# Patient Record
Sex: FEMALE | Race: WHITE | URBAN - METROPOLITAN AREA
[De-identification: names, ages, dates, MRNs, and addresses within clinical notes are randomized per-mention and may not be internally consistent; named-entity substitution may affect disease eponyms.]

---

## 2019-04-01 ENCOUNTER — HOSPITAL ENCOUNTER (OUTPATIENT)
Dept: LAB | Facility: HOSPITAL | Age: 74
Discharge: HOME | End: 2019-04-01
Attending: INTERNAL MEDICINE

## 2019-04-01 LAB
ANION GAP SERPL CALC-SCNC: 16 MMOL/L (ref 8–19)
BUN SERPL-MCNC: 15 MG/DL (ref 5–25)
BUN/CREAT SERPL: 17 {RATIO} (ref 6–20)
CALCIUM SERPL-MCNC: 10 MG/DL (ref 8.7–10.4)
CHLORIDE SERPL-SCNC: 102 MMOL/L (ref 99–111)
CONV CO2: 26 MMOL/L (ref 22–32)
CREAT UR-MCNC: 0.86 MG/DL (ref 0.5–0.9)
GFR SERPLBLD BASED ON 1.73 SQ M-ARVRAT: >60 ML/MIN/{1.73_M2}
GLUCOSE SERPL-MCNC: 96 MG/DL (ref 65–99)
OSMOLALITY SERPL CALC.SUM OF ELEC: 291 MOSM/KG (ref 273–304)
POTASSIUM SERPL-SCNC: 4.2 MMOL/L (ref 3.5–5.3)
SODIUM SERPL-SCNC: 140 MMOL/L (ref 135–147)
TSH SERPL-ACNC: 0.81 M[IU]/L (ref 0.27–4.2)

## 2019-05-10 ENCOUNTER — CONVERSION ENCOUNTER (OUTPATIENT)
Dept: ORTHOPEDIC SURGERY | Facility: CLINIC | Age: 74
End: 2019-05-10

## 2019-05-10 ENCOUNTER — OFFICE VISIT CONVERTED (OUTPATIENT)
Dept: ORTHOPEDIC SURGERY | Facility: CLINIC | Age: 74
End: 2019-05-10
Attending: ORTHOPAEDIC SURGERY

## 2019-08-05 ENCOUNTER — HOSPITAL ENCOUNTER (OUTPATIENT)
Dept: LAB | Facility: HOSPITAL | Age: 74
Discharge: HOME | End: 2019-08-05
Attending: INTERNAL MEDICINE

## 2019-08-05 LAB
ALBUMIN SERPL-MCNC: 4.5 G/DL (ref 3.5–5)
ALBUMIN/GLOB SERPL: 1.3 {RATIO} (ref 1.4–2.6)
ALP SERPL-CCNC: 83 U/L (ref 43–160)
ALT SERPL-CCNC: 15 U/L (ref 10–40)
ANION GAP SERPL CALC-SCNC: 17 MMOL/L (ref 8–19)
AST SERPL-CCNC: 18 U/L (ref 15–50)
BASOPHILS # BLD AUTO: 0.05 10*3/UL (ref 0–0.2)
BASOPHILS NFR BLD AUTO: 1 % (ref 0–3)
BILIRUB SERPL-MCNC: 0.55 MG/DL (ref 0.2–1.3)
BUN SERPL-MCNC: 16 MG/DL (ref 5–25)
BUN/CREAT SERPL: 16 {RATIO} (ref 6–20)
CALCIUM SERPL-MCNC: 9.8 MG/DL (ref 8.7–10.4)
CHLORIDE SERPL-SCNC: 102 MMOL/L (ref 99–111)
CONV ABS IMM GRAN: 0.02 10*3/UL (ref 0–0.2)
CONV CO2: 25 MMOL/L (ref 22–32)
CONV IMMATURE GRAN: 0.4 % (ref 0–1.8)
CONV TOTAL PROTEIN: 8.1 G/DL (ref 6.3–8.2)
CREAT UR-MCNC: 0.98 MG/DL (ref 0.5–0.9)
DEPRECATED RDW RBC AUTO: 44.9 FL (ref 36.4–46.3)
EOSINOPHIL # BLD AUTO: 0.04 10*3/UL (ref 0–0.7)
EOSINOPHIL # BLD AUTO: 0.8 % (ref 0–7)
ERYTHROCYTE [DISTWIDTH] IN BLOOD BY AUTOMATED COUNT: 12.6 % (ref 11.7–14.4)
FOLATE SERPL-MCNC: 6.8 NG/ML (ref 4.8–20)
GFR SERPLBLD BASED ON 1.73 SQ M-ARVRAT: 58 ML/MIN/{1.73_M2}
GLOBULIN UR ELPH-MCNC: 3.6 G/DL (ref 2–3.5)
GLUCOSE SERPL-MCNC: 103 MG/DL (ref 65–99)
HCT VFR BLD AUTO: 41.6 % (ref 37–47)
HGB BLD-MCNC: 13.8 G/DL (ref 12–16)
LYMPHOCYTES # BLD AUTO: 1.74 10*3/UL (ref 1–5)
LYMPHOCYTES NFR BLD AUTO: 33.5 % (ref 20–45)
MCH RBC QN AUTO: 32.2 PG (ref 27–31)
MCHC RBC AUTO-ENTMCNC: 33.2 G/DL (ref 33–37)
MCV RBC AUTO: 97 FL (ref 81–99)
MONOCYTES # BLD AUTO: 0.45 10*3/UL (ref 0.2–1.2)
MONOCYTES NFR BLD AUTO: 8.7 % (ref 3–10)
NEUTROPHILS # BLD AUTO: 2.89 10*3/UL (ref 2–8)
NEUTROPHILS NFR BLD AUTO: 55.6 % (ref 30–85)
NRBC CBCN: 0 % (ref 0–0.7)
OSMOLALITY SERPL CALC.SUM OF ELEC: 291 MOSM/KG (ref 273–304)
PLATELET # BLD AUTO: 269 10*3/UL (ref 130–400)
PMV BLD AUTO: 9.5 FL (ref 9.4–12.3)
POTASSIUM SERPL-SCNC: 4.3 MMOL/L (ref 3.5–5.3)
RBC # BLD AUTO: 4.29 10*6/UL (ref 4.2–5.4)
SODIUM SERPL-SCNC: 140 MMOL/L (ref 135–147)
TSH SERPL-ACNC: 1.3 M[IU]/L (ref 0.27–4.2)
VIT B12 SERPL-MCNC: 208 PG/ML (ref 211–911)
WBC # BLD AUTO: 5.19 10*3/UL (ref 4.8–10.8)

## 2019-08-16 ENCOUNTER — HOSPITAL ENCOUNTER (OUTPATIENT)
Dept: MAMMOGRAPHY | Facility: HOSPITAL | Age: 74
Discharge: HOME | End: 2019-08-16
Attending: INTERNAL MEDICINE

## 2019-08-21 ENCOUNTER — HOSPITAL ENCOUNTER (OUTPATIENT)
Dept: ULTRASOUND IMAGING | Facility: HOSPITAL | Age: 74
Discharge: HOME | End: 2019-08-21
Attending: INTERNAL MEDICINE

## 2019-08-26 ENCOUNTER — OFFICE VISIT CONVERTED (OUTPATIENT)
Dept: SURGERY | Facility: CLINIC | Age: 74
End: 2019-08-26
Attending: SURGERY

## 2019-08-27 ENCOUNTER — HOSPITAL ENCOUNTER (OUTPATIENT)
Dept: PERIOP | Facility: HOSPITAL | Age: 74
Setting detail: HOSPITAL OUTPATIENT SURGERY
Discharge: HOME | End: 2019-08-27
Attending: SURGERY

## 2019-08-28 ENCOUNTER — HOSPITAL ENCOUNTER (OUTPATIENT)
Dept: ONCOLOGY | Facility: HOSPITAL | Age: 74
Discharge: HOME | End: 2019-08-28
Attending: INTERNAL MEDICINE

## 2019-08-28 LAB
ALBUMIN SERPL-MCNC: 4.6 G/DL (ref 3.5–5)
ALBUMIN/GLOB SERPL: 1.3 {RATIO} (ref 1.4–2.6)
ALP SERPL-CCNC: 84 U/L (ref 43–160)
ALT SERPL-CCNC: 10 U/L (ref 10–40)
ANION GAP SERPL CALC-SCNC: 19 MMOL/L (ref 8–19)
AST SERPL-CCNC: 17 U/L (ref 15–50)
BASOPHILS # BLD AUTO: 0.03 10*3/UL (ref 0–0.2)
BASOPHILS NFR BLD AUTO: 0.4 % (ref 0–3)
BILIRUB SERPL-MCNC: 0.72 MG/DL (ref 0.2–1.3)
BUN SERPL-MCNC: 18 MG/DL (ref 5–25)
BUN/CREAT SERPL: 17 {RATIO} (ref 6–20)
CALCIUM SERPL-MCNC: 9.5 MG/DL (ref 8.7–10.4)
CHLORIDE SERPL-SCNC: 103 MMOL/L (ref 99–111)
CONV ABS IMM GRAN: 0.01 10*3/UL (ref 0–0.2)
CONV CO2: 24 MMOL/L (ref 22–32)
CONV IMMATURE GRAN: 0.1 % (ref 0–1.8)
CONV TOTAL PROTEIN: 8.1 G/DL (ref 6.3–8.2)
CREAT UR-MCNC: 1.05 MG/DL (ref 0.5–0.9)
DEPRECATED RDW RBC AUTO: 44.1 FL (ref 36.4–46.3)
EOSINOPHIL # BLD AUTO: 0.07 10*3/UL (ref 0–0.7)
EOSINOPHIL # BLD AUTO: 1 % (ref 0–7)
ERYTHROCYTE [DISTWIDTH] IN BLOOD BY AUTOMATED COUNT: 12.3 % (ref 11.7–14.4)
GFR SERPLBLD BASED ON 1.73 SQ M-ARVRAT: 53 ML/MIN/{1.73_M2}
GLOBULIN UR ELPH-MCNC: 3.5 G/DL (ref 2–3.5)
GLUCOSE SERPL-MCNC: 95 MG/DL (ref 65–99)
HCT VFR BLD AUTO: 41.7 % (ref 37–47)
HGB BLD-MCNC: 13.9 G/DL (ref 12–16)
LYMPHOCYTES # BLD AUTO: 2.26 10*3/UL (ref 1–5)
LYMPHOCYTES NFR BLD AUTO: 32.1 % (ref 20–45)
MCH RBC QN AUTO: 32.4 PG (ref 27–31)
MCHC RBC AUTO-ENTMCNC: 33.3 G/DL (ref 33–37)
MCV RBC AUTO: 97.2 FL (ref 81–99)
MONOCYTES # BLD AUTO: 0.61 10*3/UL (ref 0.2–1.2)
MONOCYTES NFR BLD AUTO: 8.7 % (ref 3–10)
NEUTROPHILS # BLD AUTO: 4.07 10*3/UL (ref 2–8)
NEUTROPHILS NFR BLD AUTO: 57.7 % (ref 30–85)
NRBC CBCN: 0 % (ref 0–0.7)
OSMOLALITY SERPL CALC.SUM OF ELEC: 296 MOSM/KG (ref 273–304)
PLATELET # BLD AUTO: 242 10*3/UL (ref 130–400)
PMV BLD AUTO: 9.6 FL (ref 9.4–12.3)
POTASSIUM SERPL-SCNC: 4.2 MMOL/L (ref 3.5–5.3)
RBC # BLD AUTO: 4.29 10*6/UL (ref 4.2–5.4)
SODIUM SERPL-SCNC: 142 MMOL/L (ref 135–147)
WBC # BLD AUTO: 7.05 10*3/UL (ref 4.8–10.8)

## 2019-08-29 ENCOUNTER — HOSPITAL ENCOUNTER (OUTPATIENT)
Dept: CARDIOLOGY | Facility: HOSPITAL | Age: 74
Discharge: HOME | End: 2019-08-29
Attending: INTERNAL MEDICINE

## 2019-08-30 ENCOUNTER — HOSPITAL ENCOUNTER (OUTPATIENT)
Dept: CT IMAGING | Facility: HOSPITAL | Age: 74
Discharge: HOME | End: 2019-08-30
Attending: INTERNAL MEDICINE

## 2019-09-03 ENCOUNTER — HOSPITAL ENCOUNTER (OUTPATIENT)
Dept: MRI IMAGING | Facility: HOSPITAL | Age: 74
Discharge: HOME | End: 2019-09-03
Attending: SURGERY

## 2019-09-04 ENCOUNTER — OFFICE VISIT CONVERTED (OUTPATIENT)
Dept: SURGERY | Facility: CLINIC | Age: 74
End: 2019-09-04
Attending: SURGERY

## 2019-09-06 ENCOUNTER — HOSPITAL ENCOUNTER (OUTPATIENT)
Dept: OTHER | Facility: HOSPITAL | Age: 74
Setting detail: RECURRING SERIES
Discharge: HOME | End: 2019-09-30
Attending: INTERNAL MEDICINE

## 2019-09-06 LAB
ALBUMIN SERPL-MCNC: 4.6 G/DL (ref 3.5–5)
ALBUMIN/GLOB SERPL: 1.2 {RATIO} (ref 1.4–2.6)
ALP SERPL-CCNC: 85 U/L (ref 43–160)
ALT SERPL-CCNC: 14 U/L (ref 10–40)
ANION GAP SERPL CALC-SCNC: 15 MMOL/L (ref 8–19)
AST SERPL-CCNC: 22 U/L (ref 15–50)
BASOPHILS # BLD AUTO: 0.03 10*3/UL (ref 0–0.2)
BASOPHILS NFR BLD AUTO: 0.5 % (ref 0–3)
BILIRUB SERPL-MCNC: 0.56 MG/DL (ref 0.2–1.3)
BUN SERPL-MCNC: 15 MG/DL (ref 5–25)
BUN/CREAT SERPL: 16 {RATIO} (ref 6–20)
CALCIUM SERPL-MCNC: 9.6 MG/DL (ref 8.7–10.4)
CHLORIDE SERPL-SCNC: 105 MMOL/L (ref 99–111)
CONV ABS IMM GRAN: 0.01 10*3/UL (ref 0–0.2)
CONV CO2: 25 MMOL/L (ref 22–32)
CONV IMMATURE GRAN: 0.2 % (ref 0–1.8)
CONV TOTAL PROTEIN: 8.4 G/DL (ref 6.3–8.2)
CREAT UR-MCNC: 0.96 MG/DL (ref 0.5–0.9)
DEPRECATED RDW RBC AUTO: 43.5 FL (ref 36.4–46.3)
EOSINOPHIL # BLD AUTO: 0.08 10*3/UL (ref 0–0.7)
EOSINOPHIL # BLD AUTO: 1.4 % (ref 0–7)
ERYTHROCYTE [DISTWIDTH] IN BLOOD BY AUTOMATED COUNT: 12.2 % (ref 11.7–14.4)
GFR SERPLBLD BASED ON 1.73 SQ M-ARVRAT: 59 ML/MIN/{1.73_M2}
GLOBULIN UR ELPH-MCNC: 3.8 G/DL (ref 2–3.5)
GLUCOSE SERPL-MCNC: 94 MG/DL (ref 65–99)
HCT VFR BLD AUTO: 42.7 % (ref 37–47)
HGB BLD-MCNC: 14.3 G/DL (ref 12–16)
LYMPHOCYTES # BLD AUTO: 2.08 10*3/UL (ref 1–5)
LYMPHOCYTES NFR BLD AUTO: 36.4 % (ref 20–45)
MCH RBC QN AUTO: 32.2 PG (ref 27–31)
MCHC RBC AUTO-ENTMCNC: 33.5 G/DL (ref 33–37)
MCV RBC AUTO: 96.2 FL (ref 81–99)
MONOCYTES # BLD AUTO: 0.5 10*3/UL (ref 0.2–1.2)
MONOCYTES NFR BLD AUTO: 8.8 % (ref 3–10)
NEUTROPHILS # BLD AUTO: 3.01 10*3/UL (ref 2–8)
NEUTROPHILS NFR BLD AUTO: 52.7 % (ref 30–85)
NRBC CBCN: 0 % (ref 0–0.7)
OSMOLALITY SERPL CALC.SUM OF ELEC: 293 MOSM/KG (ref 273–304)
PLATELET # BLD AUTO: 255 10*3/UL (ref 130–400)
PMV BLD AUTO: 9.9 FL (ref 9.4–12.3)
POTASSIUM SERPL-SCNC: 4.2 MMOL/L (ref 3.5–5.3)
RBC # BLD AUTO: 4.44 10*6/UL (ref 4.2–5.4)
SODIUM SERPL-SCNC: 141 MMOL/L (ref 135–147)
WBC # BLD AUTO: 5.71 10*3/UL (ref 4.8–10.8)

## 2019-09-09 LAB
APPEARANCE UR: ABNORMAL
BILIRUB UR QL: NEGATIVE
COLOR UR: YELLOW
CONV BACTERIA: NEGATIVE
CONV COLLECTION SOURCE (UA): ABNORMAL
CONV HYALINE CASTS IN URINE MICRO: ABNORMAL /[LPF]
CONV UROBILINOGEN IN URINE BY AUTOMATED TEST STRIP: 0.2 {EHRLICHU}/DL (ref 0.1–1)
GLUCOSE UR QL: NEGATIVE MG/DL
HGB UR QL STRIP: ABNORMAL
KETONES UR QL STRIP: NEGATIVE MG/DL
LEUKOCYTE ESTERASE UR QL STRIP: ABNORMAL
NITRITE UR QL STRIP: NEGATIVE
PH UR STRIP.AUTO: 5 [PH] (ref 5–8)
PROT UR QL: NEGATIVE MG/DL
RBC #/AREA URNS HPF: ABNORMAL /[HPF]
SP GR UR: 1.02 (ref 1–1.03)
SQUAMOUS SPT QL MICRO: ABNORMAL /[HPF]
WBC #/AREA URNS HPF: ABNORMAL /[HPF]

## 2019-09-30 LAB
ALBUMIN SERPL-MCNC: 4.3 G/DL (ref 3.5–5)
ALBUMIN/GLOB SERPL: 1.2 {RATIO} (ref 1.4–2.6)
ALP SERPL-CCNC: 79 U/L (ref 43–160)
ALT SERPL-CCNC: 23 U/L (ref 10–40)
ANION GAP SERPL CALC-SCNC: 18 MMOL/L (ref 8–19)
AST SERPL-CCNC: 25 U/L (ref 15–50)
BASOPHILS # BLD AUTO: 0.08 10*3/UL (ref 0–0.2)
BASOPHILS NFR BLD AUTO: 1.9 % (ref 0–3)
BILIRUB SERPL-MCNC: 0.39 MG/DL (ref 0.2–1.3)
BUN SERPL-MCNC: 16 MG/DL (ref 5–25)
BUN/CREAT SERPL: 20 {RATIO} (ref 6–20)
CALCIUM SERPL-MCNC: 9.7 MG/DL (ref 8.7–10.4)
CHLORIDE SERPL-SCNC: 104 MMOL/L (ref 99–111)
CONV ABS IMM GRAN: 0.01 10*3/UL (ref 0–0.54)
CONV CO2: 24 MMOL/L (ref 22–32)
CONV EOSINOPHILS PERCENT BY MANUAL COUNT: 0 % (ref 0–7)
CONV IMMATURE GRAN: 0.2 % (ref 0–0.4)
CONV TOTAL PROTEIN: 7.8 G/DL (ref 6.3–8.2)
CREAT UR-MCNC: 0.8 MG/DL (ref 0.5–0.9)
EOSINOPHIL # BLD MANUAL: 0 10*3/UL (ref 0–0.7)
ERYTHROCYTE [DISTWIDTH] IN BLOOD BY AUTOMATED COUNT: 12.5 % (ref 11.5–14.5)
ERYTHROCYTE [DISTWIDTH] IN BLOOD BY AUTOMATED COUNT: 43.4 FL
GFR SERPLBLD BASED ON 1.73 SQ M-ARVRAT: >60 ML/MIN/{1.73_M2}
GLOBULIN UR ELPH-MCNC: 3.5 G/DL (ref 2–3.5)
GLUCOSE SERPL-MCNC: 96 MG/DL (ref 65–99)
HBA1C MFR BLD: 12.7 G/DL (ref 12–16)
HCT VFR BLD AUTO: 38 % (ref 37–47)
LYMPHOCYTES # BLD AUTO: 1.11 10*3/UL (ref 1–5)
LYMPHOCYTES NFR BLD AUTO: 26.4 % (ref 20–45)
MCH RBC QN AUTO: 32.5 PG (ref 27–31)
MCHC RBC AUTO-ENTMCNC: 33.4 G/DL (ref 33–37)
MCV RBC AUTO: 97.2 FL (ref 81–99)
MONOCYTES # BLD AUTO: 0.61 10*3/UL (ref 0.2–1.2)
MONOCYTES NFR BLD MANUAL: 14.5 % (ref 3–10)
NEUTROPHILS # BLD AUTO: 2.4 10*3/UL (ref 2–8)
NEUTROPHILS NFR BLD MANUAL: 57 % (ref 30–85)
OSMOLALITY SERPL CALC.SUM OF ELEC: 295 MOSM/KG (ref 273–304)
PLATELET # BLD AUTO: 440 10*3/UL (ref 130–400)
PMV BLD AUTO: 9.1 FL (ref 7.4–10.4)
POTASSIUM SERPL-SCNC: 4.3 MMOL/L (ref 3.5–5.3)
RBC MORPH BLD: 3.91 10*6/UL (ref 4.2–5.4)
SODIUM SERPL-SCNC: 142 MMOL/L (ref 135–147)
WBC # BLD AUTO: 4.21 10*3/UL (ref 4.8–10.8)

## 2019-10-10 ENCOUNTER — HOSPITAL ENCOUNTER (OUTPATIENT)
Dept: LAB | Facility: HOSPITAL | Age: 74
Discharge: HOME | End: 2019-10-10
Attending: INTERNAL MEDICINE

## 2019-10-10 LAB
APPEARANCE UR: ABNORMAL
BILIRUB UR QL: NEGATIVE
COLOR UR: ABNORMAL
CONV BACTERIA: ABNORMAL
CONV COLLECTION SOURCE (UA): ABNORMAL
CONV UROBILINOGEN IN URINE BY AUTOMATED TEST STRIP: 0.2 {EHRLICHU}/DL (ref 0.1–1)
CONV YEAST, UA: ABNORMAL
GLUCOSE UR QL: NEGATIVE MG/DL
HGB UR QL STRIP: ABNORMAL
KETONES UR QL STRIP: NEGATIVE MG/DL
LEUKOCYTE ESTERASE UR QL STRIP: ABNORMAL
NITRITE UR QL STRIP: NEGATIVE
PH UR STRIP.AUTO: 5.5 [PH] (ref 5–8)
PROT UR QL: 30 MG/DL
RBC #/AREA URNS HPF: ABNORMAL /[HPF]
SP GR UR: 1.02 (ref 1–1.03)
SQUAMOUS SPT QL MICRO: ABNORMAL /[HPF]
WBC #/AREA URNS HPF: ABNORMAL /[HPF]

## 2019-10-12 LAB
AMOXICILLIN+CLAV SUSC ISLT: 4
AMPICILLIN SUSC ISLT: 8
AMPICILLIN+SULBAC SUSC ISLT: 4
BACTERIA UR CULT: ABNORMAL
CEFAZOLIN SUSC ISLT: <=4
CEFEPIME SUSC ISLT: <=1
CEFTAZIDIME SUSC ISLT: <=1
CEFTRIAXONE SUSC ISLT: <=1
CEFUROXIME ORAL SUSC ISLT: 4
CEFUROXIME PARENTER SUSC ISLT: 4
CIPROFLOXACIN SUSC ISLT: <=0.25
ERTAPENEM SUSC ISLT: <=0.5
GENTAMICIN SUSC ISLT: <=1
LEVOFLOXACIN SUSC ISLT: <=0.12
NITROFURANTOIN SUSC ISLT: <=16
TETRACYCLINE SUSC ISLT: <=1
TMP SMX SUSC ISLT: <=20
TOBRAMYCIN SUSC ISLT: <=1

## 2019-10-14 ENCOUNTER — OFFICE VISIT CONVERTED (OUTPATIENT)
Dept: ONCOLOGY | Facility: HOSPITAL | Age: 74
End: 2019-10-14
Attending: INTERNAL MEDICINE

## 2019-10-14 ENCOUNTER — HOSPITAL ENCOUNTER (OUTPATIENT)
Dept: OTHER | Facility: HOSPITAL | Age: 74
Setting detail: RECURRING SERIES
Discharge: HOME | End: 2019-10-31
Attending: INTERNAL MEDICINE

## 2019-10-14 LAB
ALBUMIN SERPL-MCNC: 4 G/DL (ref 3.5–5)
ALBUMIN/GLOB SERPL: 1.1 {RATIO} (ref 1.4–2.6)
ALP SERPL-CCNC: 119 U/L (ref 43–160)
ALT SERPL-CCNC: 17 U/L (ref 10–40)
ANION GAP SERPL CALC-SCNC: 18 MMOL/L (ref 8–19)
AST SERPL-CCNC: 16 U/L (ref 15–50)
BASOPHILS # BLD AUTO: 0.02 10*3/UL (ref 0–0.2)
BASOPHILS NFR BLD AUTO: 0.4 % (ref 0–3)
BILIRUB SERPL-MCNC: 0.37 MG/DL (ref 0.2–1.3)
BUN SERPL-MCNC: 13 MG/DL (ref 5–25)
BUN/CREAT SERPL: 14 {RATIO} (ref 6–20)
CALCIUM SERPL-MCNC: 9.6 MG/DL (ref 8.7–10.4)
CHLORIDE SERPL-SCNC: 100 MMOL/L (ref 99–111)
CONV ABS IMM GRAN: 0.12 10*3/UL (ref 0–0.54)
CONV CO2: 24 MMOL/L (ref 22–32)
CONV EOSINOPHILS PERCENT BY MANUAL COUNT: 0.8 % (ref 0–7)
CONV IMMATURE GRAN: 2.3 % (ref 0–0.4)
CONV TOTAL PROTEIN: 7.8 G/DL (ref 6.3–8.2)
CREAT UR-MCNC: 0.9 MG/DL (ref 0.5–0.9)
EOSINOPHIL # BLD MANUAL: 0.04 10*3/UL (ref 0–0.7)
ERYTHROCYTE [DISTWIDTH] IN BLOOD BY AUTOMATED COUNT: 12.4 % (ref 11.5–14.5)
ERYTHROCYTE [DISTWIDTH] IN BLOOD BY AUTOMATED COUNT: 43 FL
GFR SERPLBLD BASED ON 1.73 SQ M-ARVRAT: >60 ML/MIN/{1.73_M2}
GLOBULIN UR ELPH-MCNC: 3.8 G/DL (ref 2–3.5)
GLUCOSE SERPL-MCNC: 129 MG/DL (ref 65–99)
HBA1C MFR BLD: 11.4 G/DL (ref 12–16)
HCT VFR BLD AUTO: 34.1 % (ref 37–47)
LYMPHOCYTES # BLD AUTO: 0.29 10*3/UL (ref 1–5)
LYMPHOCYTES NFR BLD AUTO: 5.6 % (ref 20–45)
MCH RBC QN AUTO: 32.5 PG (ref 27–31)
MCHC RBC AUTO-ENTMCNC: 33.4 G/DL (ref 33–37)
MCV RBC AUTO: 97.2 FL (ref 81–99)
MONOCYTES # BLD AUTO: 0.48 10*3/UL (ref 0.2–1.2)
MONOCYTES NFR BLD MANUAL: 9.3 % (ref 3–10)
NEUTROPHILS # BLD AUTO: 4.21 10*3/UL (ref 2–8)
NEUTROPHILS NFR BLD MANUAL: 81.6 % (ref 30–85)
OSMOLALITY SERPL CALC.SUM OF ELEC: 288 MOSM/KG (ref 273–304)
PLATELET # BLD AUTO: 144 10*3/UL (ref 130–400)
PMV BLD AUTO: 8.8 FL (ref 7.4–10.4)
POTASSIUM SERPL-SCNC: 3.8 MMOL/L (ref 3.5–5.3)
RBC MORPH BLD: 3.51 10*6/UL (ref 4.2–5.4)
SODIUM SERPL-SCNC: 138 MMOL/L (ref 135–147)
WBC # BLD AUTO: 5.16 10*3/UL (ref 4.8–10.8)

## 2019-10-16 ENCOUNTER — HOSPITAL ENCOUNTER (OUTPATIENT)
Dept: GENERAL RADIOLOGY | Facility: HOSPITAL | Age: 74
Discharge: HOME | End: 2019-10-16
Attending: NURSE PRACTITIONER

## 2019-10-21 ENCOUNTER — CONVERSION ENCOUNTER (OUTPATIENT)
Dept: OTOLARYNGOLOGY | Facility: CLINIC | Age: 74
End: 2019-10-21

## 2019-10-21 ENCOUNTER — OFFICE VISIT CONVERTED (OUTPATIENT)
Dept: OTOLARYNGOLOGY | Facility: CLINIC | Age: 74
End: 2019-10-21
Attending: OTOLARYNGOLOGY

## 2019-10-24 ENCOUNTER — HOSPITAL ENCOUNTER (OUTPATIENT)
Dept: OTHER | Facility: HOSPITAL | Age: 74
Setting detail: RECURRING SERIES
Discharge: STILL ADMITTED OR ADMINISTRATIVE | End: 2019-10-24
Attending: INTERNAL MEDICINE

## 2019-10-24 ENCOUNTER — OFFICE VISIT CONVERTED (OUTPATIENT)
Dept: ONCOLOGY | Facility: HOSPITAL | Age: 74
End: 2019-10-24
Attending: INTERNAL MEDICINE

## 2019-10-24 ENCOUNTER — HOSPITAL ENCOUNTER (OUTPATIENT)
Dept: OTHER | Facility: HOSPITAL | Age: 74
Discharge: HOME | End: 2019-10-24
Attending: INTERNAL MEDICINE

## 2019-10-25 ENCOUNTER — HOSPITAL ENCOUNTER (OUTPATIENT)
Dept: INFUSION THERAPY | Facility: HOSPITAL | Age: 74
Setting detail: RECURRING SERIES
Discharge: HOME | End: 2019-10-31
Attending: INTERNAL MEDICINE

## 2019-10-28 ENCOUNTER — OFFICE VISIT CONVERTED (OUTPATIENT)
Dept: ONCOLOGY | Facility: HOSPITAL | Age: 74
End: 2019-10-28

## 2019-10-28 LAB
ALBUMIN SERPL-MCNC: 3.6 G/DL (ref 3.5–5)
ALBUMIN/GLOB SERPL: 0.9 {RATIO} (ref 1.4–2.6)
ALP SERPL-CCNC: 86 U/L (ref 43–160)
ALT SERPL-CCNC: 44 U/L (ref 10–40)
ANION GAP SERPL CALC-SCNC: 21 MMOL/L (ref 8–19)
AST SERPL-CCNC: 71 U/L (ref 15–50)
BASOPHILS # BLD AUTO: 0.09 10*3/UL (ref 0–0.2)
BASOPHILS # BLD: 0 % (ref 0–3)
BASOPHILS NFR BLD AUTO: 1.8 % (ref 0–3)
BILIRUB SERPL-MCNC: 1.02 MG/DL (ref 0.2–1.3)
BUN SERPL-MCNC: 9 MG/DL (ref 5–25)
BUN/CREAT SERPL: 13 {RATIO} (ref 6–20)
CALCIUM SERPL-MCNC: 9.7 MG/DL (ref 8.7–10.4)
CHLORIDE SERPL-SCNC: 98 MMOL/L (ref 99–111)
CONV ABS BANDS: 18 % (ref 1–5)
CONV ABS IMM GRAN: 0.14 10*3/UL (ref 0–0.2)
CONV ANISOCYTES: SLIGHT
CONV CO2: 25 MMOL/L (ref 22–32)
CONV IMMATURE GRAN: 2.8 % (ref 0–1.8)
CONV SEGMENTED NEUTROPHILS: 72 % (ref 45–70)
CONV TOTAL PROTEIN: 7.8 G/DL (ref 6.3–8.2)
CREAT UR-MCNC: 0.71 MG/DL (ref 0.5–0.9)
DEPRECATED RDW RBC AUTO: 44.5 FL (ref 36.4–46.3)
EOSINOPHIL # BLD AUTO: 0 % (ref 0–7)
EOSINOPHIL # BLD AUTO: 0 10*3/UL (ref 0–0.7)
EOSINOPHIL NFR BLD AUTO: 0 % (ref 0–7)
ERYTHROCYTE [DISTWIDTH] IN BLOOD BY AUTOMATED COUNT: 13.9 % (ref 11.7–14.4)
GFR SERPLBLD BASED ON 1.73 SQ M-ARVRAT: >60 ML/MIN/{1.73_M2}
GLOBULIN UR ELPH-MCNC: 4.2 G/DL (ref 2–3.5)
GLUCOSE SERPL-MCNC: 196 MG/DL (ref 65–99)
HCT VFR BLD AUTO: 31.8 % (ref 37–47)
HGB BLD-MCNC: 11.2 G/DL (ref 12–16)
LYMPHOCYTES # BLD AUTO: 0.08 10*3/UL (ref 1–5)
LYMPHOCYTES NFR BLD AUTO: 1.6 % (ref 20–45)
MCH RBC QN AUTO: 31.9 PG (ref 27–31)
MCHC RBC AUTO-ENTMCNC: 35.2 G/DL (ref 33–37)
MCV RBC AUTO: 90.6 FL (ref 81–99)
MICROCYTES BLD QL: SLIGHT
MONOCYTES # BLD AUTO: 0.82 10*3/UL (ref 0.2–1.2)
MONOCYTES NFR BLD AUTO: 16.5 % (ref 3–10)
MONOCYTES NFR BLD MANUAL: 8 % (ref 3–10)
NEUTROPHILS # BLD AUTO: 3.83 10*3/UL (ref 2–8)
NEUTROPHILS NFR BLD AUTO: 77.3 % (ref 30–85)
NRBC CBCN: 0.6 % (ref 0–0.7)
NUC CELL # PRT MANUAL: 0 /100{WBCS}
OSMOLALITY SERPL CALC.SUM OF ELEC: 296 MOSM/KG (ref 273–304)
OVALOCYTES BLD QL SMEAR: SLIGHT
PLAT MORPH BLD: NORMAL
PLATELET # BLD AUTO: 146 10*3/UL (ref 130–400)
PMV BLD AUTO: 10.4 FL (ref 9.4–12.3)
POIKILOCYTOSIS BLD QL SMEAR: SLIGHT
POLYCHROMASIA BLD QL SMEAR: ABNORMAL
POTASSIUM SERPL-SCNC: 2.7 MMOL/L (ref 3.5–5.3)
RBC # BLD AUTO: 3.51 10*6/UL (ref 4.2–5.4)
SMALL PLATELETS BLD QL SMEAR: ADEQUATE
SODIUM SERPL-SCNC: 141 MMOL/L (ref 135–147)
TOXIC GRANULATION: SLIGHT
VARIANT LYMPHS NFR BLD MANUAL: 2 % (ref 20–45)
WBC # BLD AUTO: 4.96 10*3/UL (ref 4.8–10.8)

## 2021-05-15 VITALS — HEART RATE: 76 BPM | BODY MASS INDEX: 33.15 KG/M2 | HEIGHT: 65 IN | OXYGEN SATURATION: 96 % | WEIGHT: 199 LBS

## 2021-05-15 VITALS — WEIGHT: 199 LBS | RESPIRATION RATE: 14 BRPM | BODY MASS INDEX: 33.15 KG/M2 | HEIGHT: 65 IN

## 2021-05-15 VITALS
HEART RATE: 82 BPM | DIASTOLIC BLOOD PRESSURE: 88 MMHG | TEMPERATURE: 98.8 F | WEIGHT: 182.25 LBS | HEIGHT: 65 IN | OXYGEN SATURATION: 96 % | BODY MASS INDEX: 30.37 KG/M2 | SYSTOLIC BLOOD PRESSURE: 136 MMHG | RESPIRATION RATE: 16 BRPM

## 2021-05-15 VITALS — WEIGHT: 198 LBS | HEIGHT: 65 IN | BODY MASS INDEX: 32.99 KG/M2 | RESPIRATION RATE: 16 BRPM

## 2021-05-28 VITALS
WEIGHT: 173.72 LBS | DIASTOLIC BLOOD PRESSURE: 73 MMHG | BODY MASS INDEX: 28.91 KG/M2 | TEMPERATURE: 99.3 F | RESPIRATION RATE: 16 BRPM | WEIGHT: 183.42 LBS | RESPIRATION RATE: 16 BRPM | DIASTOLIC BLOOD PRESSURE: 72 MMHG | OXYGEN SATURATION: 97 % | HEART RATE: 84 BPM | TEMPERATURE: 99.4 F | BODY MASS INDEX: 30.52 KG/M2 | SYSTOLIC BLOOD PRESSURE: 122 MMHG | HEART RATE: 69 BPM | OXYGEN SATURATION: 97 % | SYSTOLIC BLOOD PRESSURE: 116 MMHG

## 2021-05-28 VITALS
OXYGEN SATURATION: 91 % | HEIGHT: 65 IN | RESPIRATION RATE: 18 BRPM | BODY MASS INDEX: 28.94 KG/M2 | HEART RATE: 128 BPM | TEMPERATURE: 102.6 F | WEIGHT: 173.72 LBS | SYSTOLIC BLOOD PRESSURE: 169 MMHG | DIASTOLIC BLOOD PRESSURE: 88 MMHG

## 2021-05-28 NOTE — PROGRESS NOTES
Patient: DAYA HAYWOOD     Acct: [id number]     Report: #[id number]  UNIT #: [id number]     : 1945    Encounter Date:10/24/2019  PRIMARY CARE: JANINE COLIN  ***Signed***  --------------------------------------------------------------------------------------------------------------------  NURSE INTAKE      Visit Type      Established Patient Visit            Chief Complaint      TX            Referring Provider/Copies To      Referring Provider:  JANINE COLIN      Primary Care Provider:  JANINE COLIN            History and Present Illness      Past Oncology Illness History      Ms. Haywood is a 74yo WF who presents today for initial visit for recently     diagnosed breast cancer.  Patient reports that approximately 2 to 3 months ago     she noticed a full sensation in the left nipple.  She thought it was related to     her dog scratching her.  Approximately 1 month ago, she noted a lump in the left    breast.  She sought evaluation with her primary care provider who then ordered     mammogram confirming a suspicious area in the left breast.  Ultrasound confirmed    the finding.  Biopsy was recommended and this demonstrated infiltrating ductal     carcinoma, ER positive, [place] positive, HER-2 negative, Ki-67 50%.  Biopsy of a     lymph node also demonstrated a macro metastatic focus of breast cancer.  She has    been seen by Dr. Woo with surgery who has ordered MRI of the breast as     there was some question of skin involvement on the mammogram and ultrasound.      She also placed a Port-A-Cath as she recommended neoadjuvant chemotherapy.      Patient denies any other masses or lymphadenopathy.  She has been in excellent     health.  She is eating and drinking normally, her weight is maintained.  Her     energy level is adequate for all of her daily needs, ECOG PS 0.  She reports     normal bladder and bowel habits.            HPI - Oncology Interim      Patient presents today as an unscheduled visit  with complaints of increasing     mouth soreness, poor oral intake and weight loss.  She received Adriamycin and     Cytoxan recently.  For the last week or so she has had increasing sore mouth.      She was given Magic mouthwash which helps briefly but does not last very long.      She was seen by ENT and found to have a Zenker's diverticulum but they did not     feel that it was causing her mouth issues.  She has only been able to take sips     of liquids.  More solid foods cause her great difficulty.  She has lost     approximately 15 pounds over the last week or 2.  Her energy is subsequently     suffering.  She has not had any other issues with her chemotherapy.            Cancer Details            Left breast--Infiltrating ductal carcinoma ER/[place]+ HER-2- Ki-67 50%            Clinical Staging      Stage IIIB (T4N1M0)            Treatments      Chemotherapy      9/9/19 initiated DDAC            Clinical Trial Participant      No            ECOG Performance Status      0            Most Recent Lab Findings      Laboratory Tests      10/14/19 09:35            10/14/19 09:38            PAST, FAMILY   Past Medical History      Past Medical History:  Arthritis, Hypertension, Thyroid Disease      Hematology/Oncology (F):  Anemia, Breast Cancer, Skin Cancer            Past Surgical History      Biopsy (LEFT BREAST), Cholecystectomy, Skin Cancer Removal            KNEE SURGERY, SHOULDER SURGERY            Family History      Family History:  Prostate Cancer (FATHER)            BROTHER AND SISTER DIABETES.            Social History      Marital Status:  Single      Lives independently:  Yes      Number of Children:  4      Occupation:  [employer]            Tobacco Use      Tobacco status:  Never smoker            Alcohol Use      Alcohol intake:  SOMETIMES            Substance Use      Substance use:  Denies use            REVIEW OF SYSTEMS      General:  Admits: Fatigue, Weight Loss      Eye:  Admits Corrective  Lenses      ENT:  Denies Headache, Denies Sore Throat      Cardiovascular:  Denies Chest Pain      Respiratory:  Denies: Productive Cough      Gastrointestinal:  Admits Problem Swallowing      Musculoskeletal:  Admits Muscle Pain, Admits Painful Joints      Neurologic:  Denies Dizziness, Denies Numbness\Tingling            VITAL SIGNS AND SCORES      Vitals      Weight 173 lbs 11.560 oz / 78.8 kg      Temperature 99.3 F / 37.39 C      Pulse 84      Respirations 16      Blood Pressure 122/73 Sitting      Pulse Oximetry 97%, RM AIR            Pain Score      Pain Scale Used:  Numerical      Pain Intensity:  9            Fatigue Score      Fatigue (0-10 scale):  6            EXAM      General Appearance:  Positive for: Alert, Oriented x3, Cooperative;          Negative for: Acute Distress      Eye:  Positive for: Anicteric Sclerae, Moist Conjunctiva      HEENT:  Negative for: Oropharynx clear (Diffuse erythema without ulcerations of     the buccal mucosa, palate and tongue.  There are a few cotton-wool spots)      Neck:  Positive for: Supple;          Negative for: JVD, Masses      Respiratory:  Positive for: CTAB, Normal Respiratory Effort      Chest:  Port in Place      Abdomen/Gastro:  Positive for: Normal Active Bowel Sounds, Soft;          Negative for: Distention, Hepatosplenomegaly, Tenderness      Cardiovascular:  Positive for: RRR;          Negative for: Gallop, Murmur, Peripheral Edema, Rub      Psychiatric:  Positive for: Appropriate Affect, Intact Judgement            PREVENTION      Hx Influenza Vaccination:  No      2 or More Falls Past Year?:  No      Fall Past Year with Injury?:  No      Hx Pneumococcal Vaccination:  No      Encouraged to follow-up with:  PCP regarding preventative exams.      Chart initiated by      HARDEEP NUNEZ MA            ALLERGY/MEDS      Allergies      Coded Allergies:             PENICILLINS (Verified  Allergy, Severe, HIVES, THROAT SWELLING, 10/24/19)      Uncoded Allergies:              STELLA (Allergy, Unknown, 9/9/19)            Medications      Last Reconciled on 10/14/19 10:12 by JENNY TALLEY      Mouthwash (Magic Mouthwash) 1 Each Bottle      15 ML PO Q4H PRN for thrush for 30 Days, #360 ML 5 Refills         Prov: CORINA DELCID         10/21/19       Prochlorperazine Maleate (Prochlorperazine Maleate) 10 Mg Tab      10 MG PO Q6H PRN for NAUSEA AND/OR VOMITING, #60 TAB         Prov: CJ GOMEZ         8/28/19       Ondansetron Hcl (ONDANSETRON HCL) 8 Mg Tablet      8 MG PO Q8H PRN for NAUSEA for 14 Days, #42 TAB 3 Refills         Prov: CJ GOMEZ         8/28/19       Cyanocobalamin (Vitamin B-12) Inj (Cyanocobalamin Inj) 1,000 Mcg/1 Ml Vial      1000 MCG IM Q30D, VIAL         Reported         8/26/19       Levothyroxine (Levothyroxine) 0.125 Mg Tablet      0.125 MG PO Q2D, #30 TAB 0 Refills         Reported         8/26/19       Cholecalciferol (Vitamin D3*) 2,000 U Tablet      2000 UNITS PO HS, #30 TAB 0 Refills         Reported         8/26/19       Losartan Potassium (Losartan*) 50 Mg Tablet      50 MG PO HS, #60 TAB 0 Refills         Reported         8/26/19       Atenolol (ATENOLOL) 50 Mg Tablet      50 MG PO HS, #30 TAB 0 Refills         Reported         8/26/19       Levothyroxine (Levothyroxine) 0.137 Mg Tablet      0.137 MG PO Q2D, #30 TAB 0 Refills         Reported         8/26/19      Medications Reviewed:  No Changes made to meds            IMPRESSION/PLAN      Diagnosis      Thrush - B37.0      Prescription for Diflucan sent to pharmacy            Mucositis (ulcerative) due to antineoplastic therapy - K12.31      Patient with grade 3 mucositis related to her Adriamycin and Cytoxan.  She will     need dose reduction with ongoing cycles.  She will continue Magic mouthwash as     needed.  I offered her pain medication but she does not want it.  She can use     liquid Tylenol or Motrin to help with discomfort.  We discussed the need to     maximize  nutrition and fluid intake.  She does have Ensure at home and I     encouraged her to use those as meal replacements as long as she is not able to     eat well.  She will receive IV fluids today and tomorrow.  She will let us know     she needs additional fluid support.  Follow-up for next chemotherapy schedule as    planned.  Dose reduction as outlined.            Notes      New Medications      * Fluconazole (Diflucan) 40 MG/1 ML SUSP.RECON: 150 MG PO QDAY 7 Days #28            Patient Education      Patient Education Provided:  Yes            Electronically signed by CJ GOMEZ  10/24/2019 10:49       Disclaimer: Converted document may not contain table formatting or lab diagrams. Please see [employer] [facility] for the authenticated document.

## 2021-05-28 NOTE — PROGRESS NOTES
Patient: DAYA HAYWOOD     Acct: [id number]     Report: #[id number]  UNIT #: [id number]     : 1945    Encounter Date:10/28/2019  PRIMARY CARE: JANINE COLIN  ***Signed***  --------------------------------------------------------------------------------------------------------------------  NURSE INTAKE      Visit Type      Established Patient Visit            Chief Complaint      BREAST CANCER            Referring Provider/Copies To      Referring Provider:  JANINE COLIN      Primary Care Provider:  JANINE COLIN            History and Present Illness      Past Oncology Illness History      Ms. Haywood is a 74yo WF who presents today for initial visit for recently     diagnosed breast cancer.  Patient reports that approximately 2 to 3 months ago     she noticed a full sensation in the left nipple.  She thought it was related to     her dog scratching her.  Approximately 1 month ago, she noted a lump in the left    breast.  She sought evaluation with her primary care provider who then ordered     mammogram confirming a suspicious area in the left breast.  Ultrasound confirmed    the finding.  Biopsy was recommended and this demonstrated infiltrating ductal     carcinoma, ER positive, [place] positive, HER-2 negative, Ki-67 50%.  Biopsy of a     lymph node also demonstrated a macro metastatic focus of breast cancer.  She has    been seen by Dr. Woo with surgery who has ordered MRI of the breast as     there was some question of skin involvement on the mammogram and ultrasound.      She also placed a Port-A-Cath as she recommended neoadjuvant chemotherapy.      Patient denies any other masses or lymphadenopathy.  She has been in excellent     health.  She is eating and drinking normally, her weight is maintained.  Her     energy level is adequate for all of her daily needs, ECOG PS 0.  She reports     normal bladder and bowel habits.            HPI - Oncology Interim      see paper report            Cancer  Details            Left breast--Infiltrating ductal carcinoma ER/[place]+ HER-2- Ki-67 50%            Clinical Staging      Stage IIIB (T4N1M0)            Treatments      Chemotherapy      9/9/19 initiated DDAC            Clinical Trial Participant      No            ECOG Performance Status      0            Most Recent Lab Findings      Laboratory Tests      10/14/19 09:35            10/14/19 09:38            PAST, FAMILY   Past Medical History      Past Medical History:  Arthritis, Hypertension, Thyroid Disease      Hematology/Oncology (F):  Anemia, Breast Cancer, Skin Cancer            Past Surgical History      Biopsy (LEFT BREAST), Cholecystectomy, Skin Cancer Removal            KNEE SURGERY, SHOULDER SURGERY            Family History      Family History:  Prostate Cancer (FATHER)            BROTHER AND SISTER DIABETES.            Social History      Marital Status:  Single      Lives independently:  Yes      Number of Children:  4      Occupation:  [employer]            Tobacco Use      Tobacco status:  Never smoker            Alcohol Use      Alcohol intake:  SOMETIMES            Substance Use      Substance use:  Denies use            REVIEW OF SYSTEMS      General:  Admits: Fatigue, Weight Loss      Eye:  Denies Corrective Lenses      ENT:  Denies Headache      Cardiovascular:  Admits Chest Pain      Respiratory:  Admits: Productive Cough      Gastrointestinal:  Denies Constipation      Genitourinary:  Denies Blood in Urine      Musculoskeletal:  Denies Back Pain      Integumentary:  Denies Itching      Neurologic:  Denies Numbness\Tingling      Psychiatric:  Denies Depression      Endocrine:  Denies Heat Intolerance      Hematologic/Lymphatic:  Denies Bleeding      Reproductive:  Denies: Menopause            VITAL SIGNS AND SCORES      Vitals      Height 5 ft 5.12 in / 165.4 cm      Weight 173 lbs 11.560 oz / 78.8 kg      BSA 1.87 m2      BMI 28.8 kg/m2      Temperature 102.6 F / 39.22 C - Temporal       Pulse 128      Respirations 18      Blood Pressure 169/88 Sitting, Left Arm      Pulse Oximetry 91%, NASAL CAA, 4 lpm            Pain Score      Experiencing any pain?:  No      Pain Scale Used:  Numerical      Pain Intensity:  0            Fatigue Score      Experiencing any fatigue?:  Yes      Fatigue (0-10 scale):  10            PREVENTION      Hx Influenza Vaccination:  No      Influenza Vaccine Declined:  No      2 or More Falls Past Year?:  No      Fall Past Year with Injury?:  No      Hx Pneumococcal Vaccination:  No      Encouraged to follow-up with:  PCP regarding preventative exams.      Chart initiated by      LAUREN GRANADOS CMA            ALLERGY/MEDS      Allergies      Coded Allergies:             PENICILLINS (Verified  Allergy, Severe, HIVES, THROAT SWELLING, 10/28/19)      Uncoded Allergies:             CODIENE (Allergy, Unknown, SOA, 10/25/19)            Medications      Last Reconciled on 10/14/19 10:12 by JENNY TALLEY      Mouthwash (Magic Mouthwash) 1 Each Bottle      15 ML PO Q4H PRN for thrush for 30 Days, #360 ML 5 Refills         Prov: CORINA DELCID         10/21/19       Prochlorperazine Maleate (Prochlorperazine Maleate) 10 Mg Tab      10 MG PO Q6H PRN for NAUSEA AND/OR VOMITING, #60 TAB         Prov: CJ GOMEZ         8/28/19       Ondansetron Hcl (ONDANSETRON HCL) 8 Mg Tablet      8 MG PO Q8H PRN for NAUSEA for 14 Days, #42 TAB 3 Refills         Prov: CJ GOMEZ         8/28/19       Cyanocobalamin (Vitamin B-12) Inj (Cyanocobalamin Inj) 1,000 Mcg/1 Ml Vial      1000 MCG IM Q30D, VIAL         Reported         8/26/19       Levothyroxine (Levothyroxine) 0.125 Mg Tablet      0.125 MG PO Q2D, #30 TAB 0 Refills         Reported         8/26/19       Cholecalciferol (Vitamin D3*) 2,000 U Tablet      2000 UNITS PO HS, #30 TAB 0 Refills         Reported         8/26/19       Losartan Potassium (Losartan*) 50 Mg Tablet      50 MG PO HS, #60 TAB 0 Refills         Reported          8/26/19       Atenolol (ATENOLOL) 50 Mg Tablet      50 MG PO HS, #30 TAB 0 Refills         Reported         8/26/19       Levothyroxine (Levothyroxine) 0.137 Mg Tablet      0.137 MG PO Q2D, #30 TAB 0 Refills         Reported         8/26/19      Medications Reviewed:  No Changes made to meds            IMPRESSION/PLAN      Patient Education      Patient Education Provided:  Yes            Electronically signed by *DIANE AGUILERA  10/28/2019 14:06       Disclaimer: Converted document may not contain table formatting or lab diagrams. Please see [employer] [facility] for the authenticated document.

## 2021-05-28 NOTE — PROGRESS NOTES
Patient: DAYA HAYWOOD     Acct: [id number]     Report: #[id number]  UNIT #: [id number]     : 1945    Encounter Date:10/14/2019  PRIMARY CARE: JANINE COLIN  ***Signed***  --------------------------------------------------------------------------------------------------------------------  NURSE INTAKE      Visit Type      Established Patient Visit            Chief Complaint      CHEMO      Intent of Therapy:  Curative            Referring Provider/Copies To      Referring Provider:  JANINE COLIN      Primary Care Provider:  JANINE COLIN            History and Present Illness      Past Oncology Illness History      Ms. Haywood is a 72yo WF who presents today for initial visit for recently di    agnosed breast cancer.  Patient reports that approximately 2 to 3 months ago she    noticed a full sensation in the left nipple.  She thought it was related to her     dog scratching her.  Approximately 1 month ago, she noted a lump in the left     breast.  She sought evaluation with her primary care provider who then ordered     mammogram confirming a suspicious area in the left breast.  Ultrasound confirmed    the finding.  Biopsy was recommended and this demonstrated infiltrating ductal     carcinoma, ER positive, [place] positive, HER-2 negative, Ki-67 50%.  Biopsy of a     lymph node also demonstrated a macro metastatic focus of breast cancer.  She has    been seen by Dr. Woo with surgery who has ordered MRI of the breast as     there was some question of skin involvement on the mammogram and ultrasound.      She also placed a Port-A-Cath as she recommended neoadjuvant chemotherapy.      Patient denies any other masses or lymphadenopathy.  She has been in excellent     health.  She is eating and drinking normally, her weight is maintained.  Her     energy level is adequate for all of her daily needs, ECOG PS 0.  She reports n    ormal bladder and bowel habits.            HPI - Oncology Interim      F/u left  breast ca--due for #3 DD AC--she notes definite changes to the left     breast--skin is softer and nipple no longer inverted.  She is feeling well.      Appetite is good; wt is stable. Reports dysphagia at times if she eats larger     pieces or with pills at times.  She denies fever, chills or pain at this time.      No distress noted.            Cancer Details            Left breast--Infiltrating ductal carcinoma ER/[place]+ HER-2- Ki-67 50%            Clinical Staging      Stage IIIB (T4N1M0)            Treatments      Chemotherapy      9/9/19 initiated DDAC            Clinical Trial Participant      No            ECOG Performance Status      0            Most Recent Lab Findings      Laboratory Tests      9/30/19 09:57            10/14/19 09:35            PAST, FAMILY   Past Medical History      Past Medical History:  Arthritis, Hypertension, Thyroid Disease      Hematology/Oncology (F):  Anemia, Breast Cancer, Skin Cancer            Past Surgical History      Biopsy (LEFT BREAST), Cholecystectomy, Skin Cancer Removal            KNEE SURGERY, SHOULDER SURGERY            Family History      Family History:  Prostate Cancer (FATHER)            BROTHER AND SISTER DIABETES.            Social History      Marital Status:  Single      Lives independently:  Yes      Number of Children:  4      Occupation:  [employer]            Tobacco Use      Tobacco status:  Never smoker            Alcohol Use      Alcohol intake:  SOMETIMES            Substance Use      Substance use:  Denies use            REVIEW OF SYSTEMS      General:  Admits: Fatigue;          Denies: Appetite Change      Eye:  Admits Corrective Lenses; Denies Blurred Vision      ENT:  Denies Headache      Cardiovascular:  Denies Chest Pain      Respiratory:  Denies: Productive Cough, Shortness of Air      Gastrointestinal:  Admits Nausea/Vomiting, Admits Problem Swallowing      Musculoskeletal:  Denies Muscle Pain, Denies Painful Joints      Neurologic:  Denies  Dizziness, Denies Numbness\Tingling            VITAL SIGNS AND SCORES      Vitals      Weight 183 lbs 6.764 oz / 83.2 kg      Temperature 99.4 F / 37.44 C - Temporal      Pulse 69      Respirations 16      Blood Pressure 116/72 Sitting      Pulse Oximetry 97%, RM AIR            Pain Score      Pain Scale Used:  Numerical      Pain Intensity:  0            Fatigue Score      Fatigue (0-10 scale):  1            EXAM      General Appearance:  Positive for: Alert, Oriented x3, Cooperative;          Negative for: Acute Distress      Eye:  Positive for: Anicteric Sclerae, Moist Conjunctiva      Other      Alopecia      Neck:  Positive for: Supple;          Negative for: JVD, Masses      Respiratory:  Positive for: CTAB, Normal Respiratory Effort      Breast - Left:  Positive for: Appearance (Nipple inversion has resolved.  Mild     erythema to the skin but no longer appears peau d'orange.  No obvious palpable     mass);          Negative for: Adenopathy      Chest:  Port in Place      Abdomen/Gastro:  Positive for: Normal Active Bowel Sounds, Soft;          Negative for: Distention, Hepatosplenomegaly, Tenderness      Cardiovascular:  Positive for: RRR;          Negative for: Gallop, Murmur, Peripheral Edema, Rub      Psychiatric:  Positive for: Appropriate Affect, Intact Judgement      Lymphatic:  Negative for: Axillary, Cervical, Infraclavicular, Supraclavicular            PREVENTION      Hx Influenza Vaccination:  No      2 or More Falls Past Year?:  No      Fall Past Year with Injury?:  No      Hx Pneumococcal Vaccination:  No      Encouraged to follow-up with:  PCP regarding preventative exams.      Chart initiated by      HARDEEP NUNEZ MA            ALLERGY/MEDS      Allergies      Coded Allergies:             PENICILLINS (Verified  Allergy, Severe, HIVES, THROAT SWELLING, 10/14/19)      Uncoded Allergies:             CODIENE (Allergy, Unknown, 9/9/19)            Medications      Last Reconciled on 10/14/19 10:12  by JENNY TALLEY      Prochlorperazine Maleate (Prochlorperazine Maleate) 10 Mg Tab      10 MG PO Q6H PRN for NAUSEA AND/OR VOMITING, #60 TAB         Prov: CJ GOMEZ         8/28/19       Ondansetron Hcl (ONDANSETRON HCL) 8 Mg Tablet      8 MG PO Q8H PRN for NAUSEA for 14 Days, #42 TAB 3 Refills         Prov: CJ GOMEZ         8/28/19       Cyanocobalamin (Vitamin B-12) Inj (Cyanocobalamin Inj) 1,000 Mcg/1 Ml Vial      1000 MCG IM Q30D, VIAL         Reported         8/26/19       Levothyroxine (Levothyroxine) 0.125 Mg Tablet      0.125 MG PO Q2D, #30 TAB 0 Refills         Reported         8/26/19       Cholecalciferol (Vitamin D3*) 2,000 U Tablet      2000 UNITS PO HS, #30 TAB 0 Refills         Reported         8/26/19       Losartan Potassium (Losartan*) 50 Mg Tablet      50 MG PO HS, #60 TAB 0 Refills         Reported         8/26/19       Atenolol (ATENOLOL) 50 Mg Tablet      50 MG PO HS, #30 TAB 0 Refills         Reported         8/26/19       Levothyroxine (Levothyroxine) 0.137 Mg Tablet      0.137 MG PO Q2D, #30 TAB 0 Refills         Reported         8/26/19      Medications Reviewed:  No Changes made to meds            IMPRESSION/PLAN      Diagnosis      Cancer of left breast, stage 3 - C50.912      Patient is on neoadjuvant chemotherapy.  She is due for dose dense Adriamycin     and Cytoxan No. 3 today.  Excellent response thus far with near resolution of     the breast abnormalities to physical examination.  Lab work is adequate for     treatment.  Proceed with cycle 3 as planned.  RTC 2 weeks for OV, cycle 4 with     labs prior.            Dysphagia         Dysphagia, unspecified type - R13.10         Dysphagia type: unspecified      Patient will have a modified barium swallow.  We discussed the possibility of     EGD based on results.  She will continue medications for acid suppression      New Diagnostics      * Mod Barium Swallow, Routine            Patient Education            [facility]      Patient Education Provided:  Yes                 Disclaimer: Converted document may not contain table formatting or lab diagrams. Please see [employer] [facility] for the authenticated document.